# Patient Record
Sex: FEMALE | Race: WHITE
[De-identification: names, ages, dates, MRNs, and addresses within clinical notes are randomized per-mention and may not be internally consistent; named-entity substitution may affect disease eponyms.]

---

## 2017-10-11 ENCOUNTER — HOSPITAL ENCOUNTER (EMERGENCY)
Dept: HOSPITAL 4 - SED | Age: 52
Discharge: HOME | End: 2017-10-11
Payer: COMMERCIAL

## 2017-10-11 VITALS — WEIGHT: 220 LBS | HEIGHT: 64 IN | BODY MASS INDEX: 37.56 KG/M2

## 2017-10-11 VITALS — SYSTOLIC BLOOD PRESSURE: 136 MMHG

## 2017-10-11 VITALS — SYSTOLIC BLOOD PRESSURE: 129 MMHG
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
## 2017-10-11 DIAGNOSIS — S93.492A: Primary | ICD-10-CM
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
## 2019-12-04 ENCOUNTER — HOSPITAL ENCOUNTER (EMERGENCY)
Dept: HOSPITAL 4 - SED | Age: 54
Discharge: HOME | End: 2019-12-04
Payer: COMMERCIAL

## 2019-12-04 VITALS — SYSTOLIC BLOOD PRESSURE: 130 MMHG

## 2019-12-04 VITALS — SYSTOLIC BLOOD PRESSURE: 124 MMHG

## 2019-12-04 VITALS — BODY MASS INDEX: 36.7 KG/M2 | HEIGHT: 64 IN | WEIGHT: 215 LBS

## 2019-12-04 DIAGNOSIS — M54.30: Primary | ICD-10-CM

## 2019-12-04 PROCEDURE — 96372 THER/PROPH/DIAG INJ SC/IM: CPT

## 2019-12-04 PROCEDURE — 72100 X-RAY EXAM L-S SPINE 2/3 VWS: CPT

## 2019-12-04 PROCEDURE — 99283 EMERGENCY DEPT VISIT LOW MDM: CPT

## 2019-12-04 NOTE — NUR
Pt BIB family to ED C/O right leg pain starting this morning that shoots up leg 
from toes to hip VSS no s/s of acute distress. No other injuries and or 
complaints noted Resting on gurney rails up

## 2019-12-04 NOTE — NUR
Patient given written and verbal discharge instructions and verbalizes 
understanding.  ER MD discussed with patient the results and treatment 
provided. Patient in stable condition. ID arm band removed.

Rx of valium, naprosyn given. Patient educated on pain management and to follow 
up with PMD. Pain Scale 5/10, MD is aware.

Opportunity for questions provided and answered. Medication side effect fact 
sheet provided.

## 2022-08-09 ENCOUNTER — HOSPITAL ENCOUNTER (INPATIENT)
Dept: HOSPITAL 4 - SED | Age: 57
LOS: 2 days | Discharge: HOME | DRG: 871 | End: 2022-08-11
Attending: INTERNAL MEDICINE | Admitting: INTERNAL MEDICINE
Payer: COMMERCIAL

## 2022-08-09 VITALS — SYSTOLIC BLOOD PRESSURE: 131 MMHG

## 2022-08-09 VITALS — WEIGHT: 229.56 LBS | BODY MASS INDEX: 39.19 KG/M2 | HEIGHT: 64 IN

## 2022-08-09 VITALS — SYSTOLIC BLOOD PRESSURE: 124 MMHG

## 2022-08-09 DIAGNOSIS — Z20.822: ICD-10-CM

## 2022-08-09 DIAGNOSIS — E44.0: ICD-10-CM

## 2022-08-09 DIAGNOSIS — I10: ICD-10-CM

## 2022-08-09 DIAGNOSIS — N12: ICD-10-CM

## 2022-08-09 DIAGNOSIS — E86.0: ICD-10-CM

## 2022-08-09 DIAGNOSIS — N17.0: ICD-10-CM

## 2022-08-09 DIAGNOSIS — A41.9: Primary | ICD-10-CM

## 2022-08-09 DIAGNOSIS — E11.42: ICD-10-CM

## 2022-08-09 DIAGNOSIS — E11.21: ICD-10-CM

## 2022-08-09 DIAGNOSIS — E66.9: ICD-10-CM

## 2022-08-09 LAB
ALBUMIN SERPL BCP-MCNC: 2.7 G/DL (ref 3.4–4.8)
ALT SERPL W P-5'-P-CCNC: 19 U/L (ref 12–78)
ANION GAP SERPL CALCULATED.3IONS-SCNC: 9 MMOL/L (ref 5–15)
APPEARANCE UR: CLEAR
AST SERPL W P-5'-P-CCNC: 16 U/L (ref 10–37)
BACTERIA URNS QL MICRO: (no result) /HPF
BASOPHILS # BLD AUTO: 0 K/UL (ref 0–0.2)
BASOPHILS NFR BLD AUTO: 0.4 % (ref 0–2)
BILIRUB SERPL-MCNC: 0.4 MG/DL (ref 0–1)
BILIRUB UR QL STRIP: NEGATIVE
BUN SERPL-MCNC: 18 MG/DL (ref 8–21)
CALCIUM SERPL-MCNC: 8.6 MG/DL (ref 8.4–11)
CHLORIDE SERPL-SCNC: 99 MMOL/L (ref 98–107)
COLOR UR: YELLOW
CREAT SERPL-MCNC: 1.72 MG/DL (ref 0.55–1.3)
EOSINOPHIL # BLD AUTO: 0.1 K/UL (ref 0–0.4)
EOSINOPHIL NFR BLD AUTO: 0.9 % (ref 0–4)
ERYTHROCYTE [DISTWIDTH] IN BLOOD BY AUTOMATED COUNT: 16.5 % (ref 9–15)
GFR SERPL CREATININE-BSD FRML MDRD: 39 ML/MIN (ref 90–?)
GLUCOSE SERPL-MCNC: 174 MG/DL (ref 70–99)
GLUCOSE UR STRIP-MCNC: NEGATIVE MG/DL
HCT VFR BLD AUTO: 36.7 % (ref 36–48)
HGB BLD-MCNC: 12.1 G/DL (ref 12–16)
HGB UR QL STRIP: (no result)
KETONES UR STRIP-MCNC: (no result) MG/DL
LEUKOCYTE ESTERASE UR QL STRIP: (no result)
LIPASE SERPL-CCNC: 47 U/L (ref 73–393)
LYMPHOCYTES # BLD AUTO: 1.2 K/UL (ref 1–5.5)
LYMPHOCYTES NFR BLD AUTO: 10.2 % (ref 20.5–51.5)
MCH RBC QN AUTO: 27 PG (ref 27–31)
MCHC RBC AUTO-ENTMCNC: 33 % (ref 32–36)
MCV RBC AUTO: 83 FL (ref 79–98)
MONOCYTES # BLD MANUAL: 0.8 K/UL (ref 0–1)
MONOCYTES # BLD MANUAL: 7.1 % (ref 1.7–9.3)
NEUTROPHILS # BLD AUTO: 9.7 K/UL (ref 1.8–7.7)
NEUTROPHILS NFR BLD AUTO: 81.4 % (ref 40–70)
NITRITE UR QL STRIP: POSITIVE
PH UR STRIP: 6.5 [PH] (ref 5–8)
PLATELET # BLD AUTO: 314 K/UL (ref 130–430)
POTASSIUM SERPL-SCNC: 3.9 MMOL/L (ref 3.5–5.1)
PROT UR QL STRIP: (no result)
RBC # BLD AUTO: 4.43 MIL/UL (ref 4.2–6.2)
SODIUM SERPLBLD-SCNC: 133 MMOL/L (ref 136–145)
SP GR UR STRIP: 1.01 (ref 1–1.03)
UROBILINOGEN UR STRIP-MCNC: 0.2 MG/DL (ref 0.2–1)
WBC # BLD AUTO: 11.9 K/UL (ref 4.8–10.8)

## 2022-08-09 PROCEDURE — C9113 INJ PANTOPRAZOLE SODIUM, VIA: HCPCS

## 2022-08-09 PROCEDURE — G0378 HOSPITAL OBSERVATION PER HR: HCPCS

## 2022-08-09 NOTE — NUR
# 20 gauge angiocath placed to left AC.  Use of asceptic technique.  Opsite 
placed over site.  Blood return noted. Flushed with 10 cc of normal saline.  No 
evidence of infiltration noted.  Patient tolerated well.

## 2022-08-09 NOTE — NUR
ADMIT NOTE

Received pt from ER to  with a diagnosis of FLORIDALMA, Dehydration. Admission process initiated. 
patient oriented to pain management, safety and call light-teach back done.

## 2022-08-09 NOTE — NUR
TRANSFER PT TO ROOM 110B VIA [facility] AAOX4, NO SOB NOTED AND DENIES DIZZINESS. 
SBAR REPORT GIVEN TO HENRIK HOPPER

## 2022-08-09 NOTE — NUR
INFORMED DR. CADE THAT PT UNABLE TO URINATE AT THIS TIME, ORDER RECEIVED TO 
GIVE ANOTHER 1 LITER OF NS BOLUS.

## 2022-08-09 NOTE — NUR
ADMISSION NOTE

Received patient from ER via gurney. Patient admitted with diagnosis of FLORIDALMA/DEHYDRATION. 
Patient is awake, alert, oriented X 4. Patient oriented to hospital room, call light, 
toileting, pain management and safety-teach back done. Patient informed that their room 
number is 110B. Personal belongings checked and Belongings List documented. Call light 
within reach.

## 2022-08-09 NOTE — NUR
Pt endorsed to Sasha HOPPER. Pt reports having dizziness, nausea/vomiting and 
fever x 3 days. Pt h/o DM.

## 2022-08-09 NOTE — NUR
Admit bed requested 



Patient will be admitted to care of .  

Admitted to TELE unit.

Diagnosis FLORIDALMA,DEHYDRATION

Inpatient (Yes or No)  YES

Observation (Yes or No) NO

Orientation concerns or request close to nursing station  (Yes or No) NO

Covid Status NEGATIVE

On vent or bipap NO

Isolation requirements NO

Needs a sitter NO

From Home (Yes or if No enter name of facility) YES

Requires Dialysis (Yes or No) NO 

Med Rec Completed (Yes of No) PENDING

## 2022-08-09 NOTE — NUR
AT BEDSIDE FOR RE EVAL, PT WAS UPDATED ON HER STATUS AND INFORMED THAT 
SHE WILL BE ADMITTED AND SHE VERBALIZED UNDERSTANDING

## 2022-08-09 NOTE — NUR
ASSUME CARE OF THIS PATIENT AAO X4, NO SOB NOTED AND NOT IN ANY DISTRESS AT 
THIS TIME, PT STATES THAT SHE HAS NO DIZZINESS WHILE LYING, DENIES CHESTPAIN 
AND DENIES ANY HEAD ACHE. INFORMED PT ON PLAN OF CARE, WILL CONTINUE TO 
MONITOR,

## 2022-08-10 VITALS — SYSTOLIC BLOOD PRESSURE: 116 MMHG

## 2022-08-10 VITALS — SYSTOLIC BLOOD PRESSURE: 131 MMHG

## 2022-08-10 VITALS — SYSTOLIC BLOOD PRESSURE: 128 MMHG

## 2022-08-10 VITALS — SYSTOLIC BLOOD PRESSURE: 132 MMHG

## 2022-08-10 VITALS — SYSTOLIC BLOOD PRESSURE: 122 MMHG

## 2022-08-10 LAB
ALBUMIN SERPL BCP-MCNC: 2.1 G/DL (ref 3.4–4.8)
ALT SERPL W P-5'-P-CCNC: 12 U/L (ref 12–78)
AMPHETAMINES UR QL SCN: NEGATIVE
ANION GAP SERPL CALCULATED.3IONS-SCNC: 9 MMOL/L (ref 5–15)
AST SERPL W P-5'-P-CCNC: 9 U/L (ref 10–37)
BARBITURATES UR QL SCN: NEGATIVE
BASOPHILS # BLD AUTO: 0.1 K/UL (ref 0–0.2)
BASOPHILS NFR BLD AUTO: 0.5 % (ref 0–2)
BENZODIAZ UR QL SCN: NEGATIVE
BILIRUB SERPL-MCNC: 0.3 MG/DL (ref 0–1)
BUN SERPL-MCNC: 13 MG/DL (ref 8–21)
BZE UR QL SCN: NEGATIVE
CALCIUM SERPL-MCNC: 7.1 MG/DL (ref 8.4–11)
CANNABINOIDS UR QL SCN: NEGATIVE
CHLORIDE SERPL-SCNC: 105 MMOL/L (ref 98–107)
CREAT SERPL-MCNC: 1.18 MG/DL (ref 0.55–1.3)
EOSINOPHIL # BLD AUTO: 0.2 K/UL (ref 0–0.4)
EOSINOPHIL NFR BLD AUTO: 2.2 % (ref 0–4)
ERYTHROCYTE [DISTWIDTH] IN BLOOD BY AUTOMATED COUNT: 16.1 % (ref 9–15)
GFR SERPL CREATININE-BSD FRML MDRD: 61 ML/MIN (ref 90–?)
GLUCOSE SERPL-MCNC: 134 MG/DL (ref 70–99)
HCT VFR BLD AUTO: 30.9 % (ref 36–48)
HGB BLD-MCNC: 10.6 G/DL (ref 12–16)
LYMPHOCYTES # BLD AUTO: 1.6 K/UL (ref 1–5.5)
LYMPHOCYTES NFR BLD AUTO: 15.9 % (ref 20.5–51.5)
MCH RBC QN AUTO: 28 PG (ref 27–31)
MCHC RBC AUTO-ENTMCNC: 34 % (ref 32–36)
MCV RBC AUTO: 81 FL (ref 79–98)
METHADONE UR-SCNC: NEGATIVE UMOL/L
METHAMPHET UR-SCNC: NEGATIVE UMOL/L
MONOCYTES # BLD MANUAL: 0.8 K/UL (ref 0–1)
MONOCYTES # BLD MANUAL: 8 % (ref 1.7–9.3)
NEUTROPHILS # BLD AUTO: 7.6 K/UL (ref 1.8–7.7)
NEUTROPHILS NFR BLD AUTO: 73.4 % (ref 40–70)
OPIATES UR QL SCN: NEGATIVE
OXYCODONE SERPL-MCNC: NEGATIVE NG/ML
PCP UR QL SCN: NEGATIVE
PLATELET # BLD AUTO: 279 K/UL (ref 130–430)
POTASSIUM SERPL-SCNC: 3.9 MMOL/L (ref 3.5–5.1)
RBC # BLD AUTO: 3.79 MIL/UL (ref 4.2–6.2)
SODIUM SERPLBLD-SCNC: 136 MMOL/L (ref 136–145)
TRICYCLICS UR-MCNC: NEGATIVE NG/ML
URINE PROPOXYPHENE SCREEN: NEGATIVE
WBC # BLD AUTO: 10.3 K/UL (ref 4.8–10.8)

## 2022-08-10 RX ADMIN — SODIUM CHLORIDE SCH MG: 9 INJECTION, SOLUTION INTRAVENOUS at 20:42

## 2022-08-10 RX ADMIN — Medication SCH EA: at 11:44

## 2022-08-10 RX ADMIN — Medication SCH EA: at 06:02

## 2022-08-10 RX ADMIN — TAZOBACTAM SODIUM AND PIPERACILLIN SODIUM SCH MLS/HR: 375; 3 INJECTION, SOLUTION INTRAVENOUS at 19:16

## 2022-08-10 RX ADMIN — SODIUM CHLORIDE SCH MLS/HR: 9 INJECTION, SOLUTION INTRAVENOUS at 20:42

## 2022-08-10 RX ADMIN — ACETAMINOPHEN PRN MG: 325 TABLET ORAL at 01:14

## 2022-08-10 RX ADMIN — Medication SCH EA: at 20:42

## 2022-08-10 RX ADMIN — SODIUM CHLORIDE SCH MLS/HR: 9 INJECTION, SOLUTION INTRAVENOUS at 11:34

## 2022-08-10 RX ADMIN — SODIUM CHLORIDE SCH MLS/HR: 9 INJECTION, SOLUTION INTRAVENOUS at 00:58

## 2022-08-10 RX ADMIN — ACETAMINOPHEN PRN MG: 325 TABLET ORAL at 11:37

## 2022-08-10 RX ADMIN — SODIUM CHLORIDE SCH MG: 9 INJECTION, SOLUTION INTRAVENOUS at 09:12

## 2022-08-10 RX ADMIN — Medication SCH EA: at 17:12

## 2022-08-10 RX ADMIN — TAZOBACTAM SODIUM AND PIPERACILLIN SODIUM SCH MLS/HR: 375; 3 INJECTION, SOLUTION INTRAVENOUS at 23:30

## 2022-08-10 NOTE — NUR
Closing note 

Pt awake resting in bed. No s/s of respiratory distress. Breathing even and unlabored. IV 
site intact and patent with fluids running at ordered rate. All needs met throughout shift. 
Fall and safety precautions in place with bed in lowest position and call light within reach

## 2022-08-10 NOTE — NUR
21:00. All due meds given, tolerated well. Resting comfortably on bed with her heating pad 
on. 

22:00 IV changed to right forearm gauge 20 patent and with good blood return, removed Left 
AC gauge 20 , intact catheter. Tolerated well.

## 2022-08-10 NOTE — NUR
Report received from WARD Lee for continuity of care. Patient in stable condition. 
Resting. No distress noted.

## 2022-08-10 NOTE — NUR
CONSULTATION PAGED/CALLED

Reason for Consultation: FEVER

Person Who was Notified: SHY

Consulting Physician: JORGE A

Consultant Specialty: 

Ordering Physician: JUDE

## 2022-08-10 NOTE — NUR
Opening notes. (late entry) 19:30. Patient is awake and alert x 4. No s/s of distress or 
bleeding. Patient reports mild lower back pain but sister will come and bring her heating 
pad which usually helps her with her back pain.

## 2022-08-10 NOTE — NUR
Report given to WARD Montero for continuity of care. Patient in stable condition. No distress 
noted. Patient in stable condition. No distress indicated. Patient able to move around.

## 2022-08-11 VITALS — SYSTOLIC BLOOD PRESSURE: 128 MMHG

## 2022-08-11 VITALS — SYSTOLIC BLOOD PRESSURE: 150 MMHG

## 2022-08-11 VITALS — SYSTOLIC BLOOD PRESSURE: 152 MMHG

## 2022-08-11 VITALS — SYSTOLIC BLOOD PRESSURE: 140 MMHG

## 2022-08-11 LAB
ANION GAP SERPL CALCULATED.3IONS-SCNC: 12 MMOL/L (ref 5–15)
BASOPHILS # BLD AUTO: 0.1 K/UL (ref 0–0.2)
BASOPHILS NFR BLD AUTO: 0.7 % (ref 0–2)
BUN SERPL-MCNC: 10 MG/DL (ref 8–21)
CALCIUM SERPL-MCNC: 8.2 MG/DL (ref 8.4–11)
CHLORIDE SERPL-SCNC: 103 MMOL/L (ref 98–107)
CREAT SERPL-MCNC: 1.19 MG/DL (ref 0.55–1.3)
EOSINOPHIL # BLD AUTO: 0.2 K/UL (ref 0–0.4)
EOSINOPHIL NFR BLD AUTO: 2.3 % (ref 0–4)
ERYTHROCYTE [DISTWIDTH] IN BLOOD BY AUTOMATED COUNT: 16.6 % (ref 9–15)
GFR SERPL CREATININE-BSD FRML MDRD: 60 ML/MIN (ref 90–?)
GLUCOSE SERPL-MCNC: 135 MG/DL (ref 70–99)
HCT VFR BLD AUTO: 32.3 % (ref 36–48)
HGB BLD-MCNC: 11 G/DL (ref 12–16)
LYMPHOCYTES # BLD AUTO: 1.6 K/UL (ref 1–5.5)
LYMPHOCYTES NFR BLD AUTO: 17.8 % (ref 20.5–51.5)
MCH RBC QN AUTO: 28 PG (ref 27–31)
MCHC RBC AUTO-ENTMCNC: 34 % (ref 32–36)
MCV RBC AUTO: 82 FL (ref 79–98)
MONOCYTES # BLD MANUAL: 0.8 K/UL (ref 0–1)
MONOCYTES # BLD MANUAL: 9 % (ref 1.7–9.3)
NEUTROPHILS # BLD AUTO: 6.1 K/UL (ref 1.8–7.7)
NEUTROPHILS NFR BLD AUTO: 70.2 % (ref 40–70)
PLATELET # BLD AUTO: 343 K/UL (ref 130–430)
POTASSIUM SERPL-SCNC: 3.8 MMOL/L (ref 3.5–5.1)
RBC # BLD AUTO: 3.95 MIL/UL (ref 4.2–6.2)
SODIUM SERPLBLD-SCNC: 135 MMOL/L (ref 136–145)
WBC # BLD AUTO: 8.7 K/UL (ref 4.8–10.8)

## 2022-08-11 RX ADMIN — TAZOBACTAM SODIUM AND PIPERACILLIN SODIUM SCH MLS/HR: 375; 3 INJECTION, SOLUTION INTRAVENOUS at 12:04

## 2022-08-11 RX ADMIN — Medication SCH EA: at 06:22

## 2022-08-11 RX ADMIN — Medication SCH EA: at 12:04

## 2022-08-11 RX ADMIN — SODIUM CHLORIDE SCH MLS/HR: 9 INJECTION, SOLUTION INTRAVENOUS at 06:15

## 2022-08-11 RX ADMIN — SODIUM CHLORIDE SCH MG: 9 INJECTION, SOLUTION INTRAVENOUS at 08:19

## 2022-08-11 RX ADMIN — TAZOBACTAM SODIUM AND PIPERACILLIN SODIUM SCH MLS/HR: 375; 3 INJECTION, SOLUTION INTRAVENOUS at 06:21

## 2022-08-11 NOTE — NUR
Discharge

Patient taken to vehicle via wheelchair, accompanied by staff and family members. No 
distress noted at time of discharge.  

-------------------------------------------------------------------------------

Addendum: 08/11/22 at 1505 by Sameera Quinn RN

-------------------------------------------------------------------------------

Patient requested an 'excuse note for work' for 2-4 days. Called Dr. Chaudhry's office, spoke 
with Stefani. Per Stefani; patient can return to hospital on tomorrow and  note. Will 
call codie Beltran) at [phone] when note is ready.

## 2022-08-11 NOTE — NUR
Discharge instructions

Both written and verbal discharge instructions given to patient. Encouraged to follow up 
with PCP in 1 week. also instructed to resume home medications and to fill written 
prescription for amoxicillin and acidophilus (Dr. Chaudhry gave written prescription to 
patient). Patient given medication reconciliation form. Exit Care provided. Patient 
verbalized understanding. MD discussed with patient the results and treatment provided. 
Ambulatory with steady gait for discharge to home. Patient in stable condition; ID band 
removed. Peripheral IV catheter removed intact with minimal bleeding; pressure dressing 
applied to site. Patient stable at this time.

## 2022-08-11 NOTE — NUR
Pt reported 7/10 pain on her lower back and she only has tylenol PRN for mild pain 1-3 pain 
scale. MD was paged, awaiting call back.

## 2022-08-11 NOTE — NUR
IVF paused per pt's request. Multiple bathroom episodes reported. Sodium level as of 
8/10/2022 = 136.

## 2022-08-11 NOTE — NUR
Scheduled IV abx given per order. Checked blood sugar: 137 mg/dl  - no coverage required. 
Patient stable; sitting in chair at bedside.

## 2022-08-11 NOTE — NUR
Called and spoke with Dr. Chaudhry, ordered Ultram 50 mg every 4 hours PRN for mod-severe pain 
4-10 scale. Patient was made aware. Awaiting pharmacy to process medication.

## 2022-08-11 NOTE — NUR
Checked blood sugar: 137 mg/dl - no coverage required. Patient stable; sitting in chair at 
bedside.

## 2022-08-11 NOTE — NUR
Called [place] at [phone] and advised that 'Excuse from work' note is ready and can be 
picked up in Lobby at reception desk.

## 2022-08-11 NOTE — NUR
CLOSING NOTES: No significant changes/events overnight. No apparent distress or active 
bleeding observed. IVF NS at 100 ml/hr resumed. All due meds given, accucheck done. Patient 
reported tolerable lower back pain 4/10 and prefers sitting on the chair as her back pain 
worsens when she's lying down on bed. Will endorse to am Nurse.